# Patient Record
(demographics unavailable — no encounter records)

---

## 2025-04-02 NOTE — REASON FOR VISIT
[Behavioral Health Urgent Care Assessment] : a behavioral health urgent care assessment [School] : school [Patient] : patient [Self] : alone

## 2025-04-09 NOTE — PHYSICAL EXAM
[Normal] : normal [None] : none [Cooperative] : cooperative [Clear] : clear [Linear/Goal Directed] : linear/goal directed [Preoccupations/Ruminations] : preoccupations/ruminations [WNL] : within normal limits [Average] : average [Mostly blames others for problems] : Mostly blames others for problems [Moderate] : moderate [FreeTextEntry8] : 'not great' [de-identified] : becomes irritable and on brink of tears when talking about going back to school [FreeTextEntry7] : convinced that needs to repeat 10th grade [de-identified] : limited

## 2025-04-09 NOTE — HISTORY OF PRESENT ILLNESS
[FreeTextEntry1] : Patient is a 15 yo M, living with parents (older brother in boarding school), currently enrolled at [employer], 10th grade (regular ed), currently not in outpatient treatment, with no past psychiatric history,  no hx of suicide attempts, no self-injury, no trauma/abuse hx, no hx of aggression or legal history, no CPS involvement, no substance use, no PMH, no significant family hx, who was referred to [facility] by school for school avoidance.   On collateral from parents, they relay that patient has had difficulty adjusting to the academic rigor and expectations since moving to [employer] [facility] for 10th grade. Prior to this year he was at a Boarding school in [place] for one year where he also struggled to be independent, thus parents decided to move to /[place] to be close to family/ help patient get good education. Prior to the 9th grade, he was raised and went to schools in [place], where he did well academically except for math until 8th grade they state when he refused to go to school once he got into the boarding school. One big difference per parents is more emphasis on homework assignments rather than tests alone in this educational system, and they theorize that is why he is struggling academically. With each quarter, he insists on completing assignments without their help, and each marking period he is disappointed with his grades. Over the course of the past few weeks, he was ill and missed school, and has refused returning to school since then. Becomes tense, angry, when they bring up school, and at one point walked out of the house, which concerned them. They state his safety is their priority and do not want to risk having that happen again. Deny any knowledge of SI but worry about it.     On interview with him, he relays struggling academically and procrastination, difficulties with focus, and acknowledge drop in grades this year. Adamently, denies any SI/HI/I/P or self-injuries urges. Future oriented. Identifies interests. Mood is good when not thinking or talking about school. Convinced that he would just do better if he can 'take a break and repeat 10th grade next year.'  He now expresses embarrassment about needing extra help and avoids attending school. When he does go to school, he calls his parents midday requesting to be picked up due to feeling uncomfortable. He has become irritable, argumentative, and isolates himself in his room when encouraged to attend school. His mother notes he believes this past experience justifies not completing the current school year. He enjoys badminton, video games, chess, and keeping up with current events.  The patient reports enjoying these extracurricular activities, but expresses feeling hopeless and unmotivated due to his academic struggles. He believes repeating 10th grade is the only solution to regain confidence and alleviate shame, and would utilize the summer to prepare.  ROS: Mood is "not great" due to academic pressure. He feels like a failure and reports excessive worry (3 times daily for 30 minutes to 1 hour) regarding school performance. He denies suicidal ideation, self-harm urges, and homicidal ideation. He sleeps 7 hours nightly but reports fatigue and low energy upon waking, with difficulty staying awake during his first-period class. Appetite is unimpaired. He reports a longstanding history of difficulty concentrating, including while studying in [place]. Symptoms include difficulty focusing, distractibility, doodling, daydreaming (family, current events, video games), restlessness, and fidgeting. These symptoms are exacerbated in math class, which he finds boring. He reports increased restroom use during math. Concentration difficulties also extend to homework completion. He denies panic symptoms, suicidal/self-injurious ideation or behavior, homicidal ideation, and past or present psychosis (including auditory hallucinations and paranoia). Symptoms of jennie and OCD are denied. No access to firearms reported. Denies alcohol, vaping, or marijuana use.  [FreeTextEntry2] : none [FreeTextEntry3] : none

## 2025-04-09 NOTE — HISTORY OF PRESENT ILLNESS
[FreeTextEntry1] : Patient is a 15 yo M, living with parents (older brother in boarding school), currently enrolled at [employer], 10th grade (regular ed), currently not in outpatient treatment, with no past psychiatric history,  no hx of suicide attempts, no self-injury, no trauma/abuse hx, no hx of aggression or legal history, no CPS involvement, no substance use, no PMH, no significant family hx, who was referred to [facility] by school for school avoidance.   On collateral from parents, they relay that patient has had difficulty adjusting to the academic rigor and expectations since moving to [facility] for 10th grade. Prior to this year he was at a Boarding school in [place] for one year where he also struggled to be independent, thus parents decided to move to /[place] to be close to family/ help patient get good education. Prior to the 9th grade, he was raised and went to schools in [place], where he did well academically except for math until 8th grade they state when he refused to go to school once he got into the boarding school. One big difference per parents is more emphasis on homework assignments rather than tests alone in this educational system, and they theorize that is why he is struggling academically. With each quarter, he insists on completing assignments without their help, and each marking period he is disappointed with his grades. Over the course of the past few weeks, he was ill and missed school, and has refused returning to school since then. Becomes tense, angry, when they bring up school, and at one point walked out of the house, which concerned them. They state his safety is their priority and do not want to risk having that happen again. Deny any knowledge of SI but worry about it.     On interview with him, he relays struggling academically and procrastination, difficulties with focus, and acknowledge drop in grades this year. Adamently, denies any SI/HI/I/P or self-injuries urges. Future oriented. Identifies interests. Mood is good when not thinking or talking about school. Convinced that he would just do better if he can 'take a break and repeat 10th grade next year.'  He now expresses embarrassment about needing extra help and avoids attending school. When he does go to school, he calls his parents midday requesting to be picked up due to feeling uncomfortable. He has become irritable, argumentative, and isolates himself in his room when encouraged to attend school. His mother notes he believes this past experience justifies not completing the current school year. He enjoys badminton, video games, chess, and keeping up with current events.  The patient reports enjoying these extracurricular activities, but expresses feeling hopeless and unmotivated due to his academic struggles. He believes repeating 10th grade is the only solution to regain confidence and alleviate shame, and would utilize the summer to prepare.  ROS: Mood is "not great" due to academic pressure. He feels like a failure and reports excessive worry (3 times daily for 30 minutes to 1 hour) regarding school performance. He denies suicidal ideation, self-harm urges, and homicidal ideation. He sleeps 7 hours nightly but reports fatigue and low energy upon waking, with difficulty staying awake during his first-period class. Appetite is unimpaired. He reports a longstanding history of difficulty concentrating, including while studying in [place]. Symptoms include difficulty focusing, distractibility, doodling, daydreaming (family, current events, video games), restlessness, and fidgeting. These symptoms are exacerbated in math class, which he finds boring. He reports increased restroom use during math. Concentration difficulties also extend to homework completion. He denies panic symptoms, suicidal/self-injurious ideation or behavior, homicidal ideation, and past or present psychosis (including auditory hallucinations and paranoia). Symptoms of jennie and OCD are denied. No access to firearms reported. Denies alcohol, vaping, or marijuana use.  [FreeTextEntry2] : none [FreeTextEntry3] : none

## 2025-04-09 NOTE — PHYSICAL EXAM
[Normal] : normal [None] : none [Cooperative] : cooperative [Clear] : clear [Linear/Goal Directed] : linear/goal directed [Preoccupations/Ruminations] : preoccupations/ruminations [WNL] : within normal limits [Average] : average [Mostly blames others for problems] : Mostly blames others for problems [Moderate] : moderate [FreeTextEntry8] : 'not great' [de-identified] : becomes irritable and on brink of tears when talking about going back to school [FreeTextEntry7] : convinced that needs to repeat 10th grade [de-identified] : limited

## 2025-04-09 NOTE — HISTORY OF PRESENT ILLNESS
[FreeTextEntry1] : Patient is a 15 yo M, living with parents (older brother in boarding school), currently enrolled at [employer], 10th grade (regular ed), currently not in outpatient treatment, with no past psychiatric history,  no hx of suicide attempts, no self-injury, no trauma/abuse hx, no hx of aggression or legal history, no CPS involvement, no substance use, no PMH, no significant family hx, who was referred to [facility] by school for school avoidance.   On collateral from parents, they relay that patient has had difficulty adjusting to the academic rigor and expectations since moving to [employer] [facility] for 10th grade. Prior to this year he was at a Boarding school in [place] for one year where he also struggled to be independent, thus parents decided to move to /[place] to be close to family/ help patient get good education. Prior to the 9th grade, he was raised and went to schools in [place], where he did well academically except for math until 8th grade they state when he refused to go to school once he got into the boarding school. One big difference per parents is more emphasis on homework assignments rather than tests alone in this educational system, and they theorize that is why he is struggling academically. With each quarter, he insists on completing assignments without their help, and each marking period he is disappointed with his grades. Over the course of the past few weeks, he was ill and missed school, and has refused returning to school since then. Becomes tense, angry, when they bring up school, and at one point walked out of the house, which concerned them. They state his safety is their priority and do not want to risk having that happen again. Deny any knowledge of SI but worry about it.     On interview with him, he relays struggling academically and procrastination, difficulties with focus, and acknowledge drop in grades this year. Adamently, denies any SI/HI/I/P or self-injuries urges. Future oriented. Identifies interests. Mood is good when not thinking or talking about school. Convinced that he would just do better if he can 'take a break and repeat 10th grade next year.'  He now expresses embarrassment about needing extra help and avoids attending school. When he does go to school, he calls his parents midday requesting to be picked up due to feeling uncomfortable. He has become irritable, argumentative, and isolates himself in his room when encouraged to attend school. His mother notes he believes this past experience justifies not completing the current school year. He enjoys badminton, video games, chess, and keeping up with current events.  The patient reports enjoying these extracurricular activities, but expresses feeling hopeless and unmotivated due to his academic struggles. He believes repeating 10th grade is the only solution to regain confidence and alleviate shame, and would utilize the summer to prepare.  ROS: Mood is "not great" due to academic pressure. He feels like a failure and reports excessive worry (3 times daily for 30 minutes to 1 hour) regarding school performance. He denies suicidal ideation, self-harm urges, and homicidal ideation. He sleeps 7 hours nightly but reports fatigue and low energy upon waking, with difficulty staying awake during his first-period class. Appetite is unimpaired. He reports a longstanding history of difficulty concentrating, including while studying in [place]. Symptoms include difficulty focusing, distractibility, doodling, daydreaming (family, current events, video games), restlessness, and fidgeting. These symptoms are exacerbated in math class, which he finds boring. He reports increased restroom use during math. Concentration difficulties also extend to homework completion. He denies panic symptoms, suicidal/self-injurious ideation or behavior, homicidal ideation, and past or present psychosis (including auditory hallucinations and paranoia). Symptoms of jennie and OCD are denied. No access to firearms reported. Denies alcohol, vaping, or marijuana use.  [FreeTextEntry2] : none [FreeTextEntry3] : none

## 2025-04-09 NOTE — PLAN
[Family] : family [Education provided regarding environmental safety/ lethal means restriction] : Education provided regarding environmental safety/ lethal means restriction [Contact was Attempted] : contact was attempted [TextBox_9] : school avoidance consultation - specifically work with parent; peds neuro for further ADHD assmnt [TextBox_11] : none [TextBox_13] : Encouraged to call back if symptoms worsen, and of course seek higher level of care if safety concerns arise (i.e. calling 911, going to the nearest ED.)  [TextBox_26] : spoke with school related to ideas of starting some accomodation related to school to encourage return to school (i.e. weighing tests more than other assignments given his particular strength on exams.)

## 2025-04-09 NOTE — PHYSICAL EXAM
[Normal] : normal [None] : none [Cooperative] : cooperative [Clear] : clear [Linear/Goal Directed] : linear/goal directed [Preoccupations/Ruminations] : preoccupations/ruminations [WNL] : within normal limits [Average] : average [Mostly blames others for problems] : Mostly blames others for problems [Moderate] : moderate [FreeTextEntry8] : 'not great' [de-identified] : becomes irritable and on brink of tears when talking about going back to school [FreeTextEntry7] : convinced that needs to repeat 10th grade [de-identified] : limited

## 2025-04-10 NOTE — PHYSICAL EXAM
[FreeTextEntry1] : Patient is a 15 yo M, living with parents (older brother in boarding school), currently enrolled at [employer], 10th grade (regular ed), currently not in outpatient treatment, with no past psychiatric history, no hx of suicide attempts, no self-injury, no trauma/abuse hx, no hx of aggression or legal history, no CPS involvement, no substance use, no PMH, no significant family hx, who was referred to [facility] by school for school avoidance. pt and mom presenting to [facility] for school avoidance counseling.  [Cooperative] : cooperative [Evasive] : evasive [Constricted] : constricted [Clear] : clear [Linear/Goal Directed] : linear/goal directed [Average] : average [WNL] : within normal limits

## 2025-04-10 NOTE — RISK ASSESSMENT
[No, patient denies ideation or behavior] : No, patient denies ideation or behavior [FreeTextEntry8] : pt denies si/sib [FreeTextEntry7] : pt denies aggression/hi [FreeTextEntry9] : pt not at risk of harming self/others [Low acute suicide risk] : Low acute suicide risk [No] : No [Not clinically indicated] : Safety Plan completed/updated (for individuals at risk): Not clinically indicated

## 2025-04-10 NOTE — PLAN
[FreeTextEntry2] : Sras-r: f1: 3.5, f2: 2.1, f3: 4, f4: 3.2.  leading function: pt meeting criteria for function 3 - gaining attention. goal for parent training and contingency management: put parents back in charge by increasing skills to address problem behaviors, focus on positive behaviors and increase rewards for attending school.  secondary function: pt meeting criteria for function 1 of school avoidance - avoid negative emotions, goal of 1:1 sessions are as follows: psychoed, somatic control exercises, gradual reexposure to school, self-reinforcement/regulation to decrease physical sx, increase ability to cope w/ discomfort and ease into reentry.   [Acceptance and Commitment Therapy] : Acceptance and Commitment Therapy  [Behavioral Parent Training] : Behavioral Parent Training  [Cognitive and/or Behavior Therapy] : Cognitive and/or Behavior Therapy  [Contingency Management] : Contingency Management  [Dialectical Behavior Therapy] : Dialectical Behavior Therapy  [Exposure +/- Response Prevention] : Exposure +/- Response Prevention  [Family Therapy (all types)] : Family Therapy (all types)  [Motivational Interviewing] : Motivational Interviewing  [Psychoeducation] : Psychoeducation  [Skills training (all types)] : Skills training (all types)  [Supportive Therapy] : Supportive Therapy [de-identified] : Pt presented to [facility] for school avoidance counseling. pt reports his avoidance began last month after being sick w/ a fever. He reports during this time he fell behind on work, grades dropped and he struggled to catch up. Pt reports having academic challenges that discourage him from wanting to go to school as he feels his grades are insufficient. He reports he struggles to participate in classes as some classes are boring. he states he has few friends in school but worries that people will say things to him about his absences. Pt denies difficulties in the AM and decides to stay home the night before or morning of, but cannot ID what drives this decision. He reports he likes being home instead of being in school. he reports he entertains himself if he is home by doing activities he enjoys instead of being in school such as playing video games, doing homework or doing other extracurricular activities. Pt reports he attends about 3 days/week but only attends [employer]. Pt appears to be working within a gradual exposure plan as he had previously had multiple full absences. Pt denies wanting to work with writer towards this goal as he believes he can do the work on his own. Writer provided pt w/ feedback and psychoed about the goals within the school avoidance program. Discussed a gradual exposure plan and contingency management plan but pt not willing to make changes to plan w/ writer, understood consequences for absences and does not want to earn rewards for going.  writer encouraged pt to bring mom to session to meet w/ writer alone. Mom reports pt is going to visit a private school as pt reports mom transferred to [employer] last year and has been struggling. Mom reports pt feels there is much academic pressure at his current school and is hoping a private school will have a more supportive/relaxed paced environment. mom reports after being sick, pt has lost motivation to tend to classwork/hw/complete make up assignments. mom reports pt is lacking confidence in school and puts pressure on himself to succeed. Mom also reports pt has a lack of social support at his school. writer provided mom w/ psychoed about the 4 functions of school avoidance and shared scores on the Sras-r: f1: 3.5, f2: 2.1, f3: 4, f4: 3.2. Discussed appropriate tx goals based on scores and discussed the importance of creating a contingency plan to remove rewards for staying home and giving rewards for going to school. mom receptive to all though report they struggle w/ consistency as they don't want to make pt feel worse, and he does not always respond well to limit setting. writer validated this and provided mom w/ psychoed, feedback and motivational interviewing. mom receptive. She reports feeling confident that pt will attend school as scheduled after spring break. she reports she plans to encourage him to catch up on schoolwork during break.  [Recommended Frequency of Visits: ____] : Recommended frequency of visits: [unfilled] [FreeTextEntry1] : Mom will update writer about status of attendance after spring break if writer's assistance is still needed.

## 2025-04-10 NOTE — REASON FOR VISIT
[Patient preference] : as per patient preference [Continuing, patient seen in-person within last 12 months] : Telehealth services are continuing as patient has been seen in-person within last 12 months. [Telehealth (audio & video) - Individual/Group] : This visit was provided via telehealth using real-time 2-way audio visual technology. [Other Location: e.g. Home (Enter Location, City,State)___] : The provider was located at [unfilled]. [Home] : The patient, [unfilled], was located at home, [unfilled], at the time of the visit. [Mother] : mother [Verbal consent obtained from patient/other participant(s)] : Verbal consent for telehealth/telephonic services obtained from patient/other participant(s) [FreeTextEntry4] : 10:00 [FreeTextEntry5] : 10:50 [FreeTextEntry2] : 4/2/25 [FreeTextEntry1] : [place] ,[place]

## 2025-04-10 NOTE — END OF VISIT
[Duration of Psychotherapy Visit (minutes spent in synchronous communication): ____] : Duration of Psychotherapy Visit (minutes spent in synchronous communication): [unfilled] [Individual Psychotherapy for 38-52 minutes] : Individual Psychotherapy for 38 - 52 minutes [Teletherapy Service Provided] : The services provided in this session were delivered via tele-therapy [FreeTextEntry3] : [place] [FreeTextEntry5] : [place] [Licensed Clinician] : Licensed Clinician

## 2025-05-21 NOTE — HISTORY OF PRESENT ILLNESS
[FreeTextEntry1] : (Chingyuenico) Devante is a 15 y/o male here for an initial visit for anxiety and depression. Patient domiciled with mom. His brother who is 17 y/o is in boarding school in [place]. Dad travels often for work. He spends a lot of time in [place]. Devante is 10th grade at [employer] in [facility] in [place]. No school based accomodations. [place] he lived [place] in 20[place].    Early development: Devante was born full term via NVD. He was discharged home with mother. He met all early developmental milestones appropriately.   EDUCATION: -School: 10th grade in [employer]  -Private school -No 504 Plan or IEP -General education classroom -Accommodations- none -Academics-A+ in all core subjects  Onset of symptoms/Course and duration: School avoidance started in March 2025 due to academic stressors and inability to keep up with schoolwork demands.  Situational triggers or stressors: academic stressors. Devante is reporting at his old school in [employer] he was encountering hardships academically which led to school avoidance. His grades were in the B and C range. He recently transferred out of [employer] to [employer]. The transfer took place in the end of April. Since the transfer Devante reports he has been doing much better academically and has an A+ in his core subjects. Devante and mom report he has not missed any school since he started his new school.  He reports a longstanding history of difficulty concentrating, including while studying in [place]. Symptoms include difficulty focusing, distractibility, doodling, daydreaming (family, current events, video games), restlessness, and fidgeting. These symptoms are exacerbated in math class, which he finds boring. He reports increased restroom use during math. Concentration difficulties also extend to homework completion.  Impact on academic, social, and family functioning talks to his friends on the phone, also has friends in [place] who he talks too  Medical History: Denies Past Psychiatric History: Denies Hospitalizations: Denies Previous interventions (therapy, school services, medications): Denies  Suicidal ideation/attempts: Denies Aggression or self-harming behaviors: Denies Sleep: Goes to bed at 11:30PM and wakes up at 7:30AM Appetite: "Normal" Current medications: Denies  Allergies: Denies Family mental health history (depression, anxiety, ADHD, etc.): Denies  Cardiac hx: Denies  Substance use in the family: Denies Behavioral issues in school/bullying/Attendance issues: Denies any current bullying, denies any attendance issues since starting new school in April 2025 Social History/Peer relationships: Good relationship with others Substance use/vape: Denies Trauma/abuse history: Denies Legal involvement (if any): Denies Access to gun/lethal weapons: Denies   JAMEY 7-Score 2 minimal anxiety  PHQ9-Score 3 minimal depression  Mental Status Examination (MSE) Appearance:  15-year-old [place] male, appears stated age. Well-groomed, dressed in casual attire. Makes appropriate eye contact and sits with an open posture. Behavior: Client demonstrates a cooperative attitude, readily engaging in the assessment process. Speech and language: Speech is of normal rate, volume, and tone. Content is relevant and coherent Mood: Client reports mood as 'pretty normal, happy.' Affect is full range, appropriate to content, and congruent with reported mood. Thought process: Thought process is logical and goal-directed, with client able to provide clear and relevant responses. Thought content: No evidence of delusions, obsessions, or phobias. Denies current suicidal or homicidal ideation. Perceptual disturbances: No reported or observed perceptual disturbances. Cognitive functioning intact Insight and judgment: fair Developmental appropriateness: Client displays normal psychomotor activity, with no unusual movements noted.   ROS: (Review of systems)   Regarding depression, patients deny previous depressive episodes. Patient denies feeling depressed or hopeless. Denies SI, NSSIB urges. Regarding bipolar, denies sxs consistent with hx of jennie/hypomania, such as 3+ days with decreased need for sleep and increased energy. Regarding anxiety, see HPI. Regarding trauma, denies. Denies flashbacks, nightmares. Denies emotional, sexual, or physical abuse. Regarding psychosis, denies AVH. Denies paranoia.  Regarding substances, denies ETOH, marijuana, vape use.

## 2025-05-21 NOTE — REASON FOR VISIT
[Initial Consultation] : an initial consultation for [Patient] : patient [Mother] : mother [ADHD] : ADHD [Other: ____] : [unfilled]

## 2025-05-21 NOTE — ASSESSMENT
[FreeTextEntry1] : Devante is a 15 y/o male presenting for an initial visit for ADHD management recommendations. He was seen in [facility] 02[facility]2/25 for school avoidance related to academic stressors. He recently transferred out of [employer] to [employer]. Since the transfer Devante reports he has been doing much better academically and has an A+ in his core subjects. Devante and mom report he has not missed any school since he started his new school. Patient was screened negative for problems such as anxiety, depression, and sleep problems.   Will proceed with ADHD evaluation. [place] forms given to mother.

## 2025-05-21 NOTE — PHYSICAL EXAM
[Well-appearing] : well-appearing [Normocephalic] : normocephalic [Alert] : alert [Conversant] : conversant [Normal gait] : normal gait

## 2025-05-21 NOTE — PLAN
[FreeTextEntry1] : -Psychoeducation provided regarding possible ADHD diagnosis. -Psychoeducation provided regarding anxiety. -Recommended psychotherapy for ADHD -SAKSHI Roney to provide mom with therapists accepted by her insurance -[place] parent and teacher forms to be completed prior to next visit. -Follow up in 1 month via TEB.

## 2025-05-21 NOTE — REVIEW OF SYSTEMS
[Patient Intake Form Reviewed] : patient intake form reviewed [Normal] : Hematologic/Lymphatic [FreeTextEntry8] : see hpi  [de-identified] : see hpi

## 2025-05-21 NOTE — CONSULT LETTER
[Dear  ___] : Dear  [unfilled], [Consult Letter:] : I had the pleasure of evaluating your patient, [unfilled]. [Consult Closing:] : Thank you very much for allowing me to participate in the care of this patient.  If you have any questions, please do not hesitate to contact me. [Sincerely,] : Sincerely, [FreeTextEntry3] : Russ Prasad    Certified Psychiatric Nurse Practitioner Pediatric Neurology [facility]

## 2025-05-21 NOTE — END OF VISIT
[Time Spent: ___ minutes] : I have spent [unfilled] minutes of time on the encounter which excludes teaching and separately reported services. [FreeTextEntry3] :  I, Dr. Casanova, personally performed the evaluation and management (E/M) services for this new patient. That E/M includes conducting the clinically appropriate initial history &/or exam, assessing all conditions, and establishing the plan of care. Today, my NUSRAT, [employer] was here to observe my evaluation and management service for this patient & follow plan of care established by me going forward.

## 2025-06-19 NOTE — END OF VISIT
[FreeTextEntry3] :  I, Dr. Ackerman, personally performed the evaluation and management (E/M) services for this new patient.? That E/M includes conducting the clinically appropriate initial history &/or exam, assessing all conditions, and establishing the plan of care.? Today, my NUSRAT, [employer] was here to observe my evaluation and management service for this patient & follow plan of care established by me going forward.  [Time Spent: ___ minutes] : I have spent [unfilled] minutes of time on the encounter which excludes teaching and separately reported services.

## 2025-06-19 NOTE — DATA REVIEWED
[FreeTextEntry1] : [place] questionnaires were completed by parents and teacher.  Parents responses: Inattention 8/9 Hyperactivity 0/9 ODD: 0/8 Conduct disorder: 0/14 Anxiety/ Depression: 1/7  Teachers responses: Inattention 9/9 Hyperactivity 1/9 ODD/ Conduct: 0/10 Anxiety/ Depression: 0/7  - ADHD Performance questions: Parents: Overall school performance, participation in organized activities  Teachers: relationship with peers following directions, assignment completion, organizational skills

## 2025-06-19 NOTE — DATA REVIEWED
[FreeTextEntry1] : [place] questionnaires were completed by parents and teacher.  Parents responses: Inattention 8/9 Hyperactivity 0/9 ODD: 0/8 Conduct disorder: 0/14 Anxiety/ Depression: 1/7  Teachers responses: Inattention 9/9 Hyperactivity 1/9 ODD/ Conduct: 0/10 Anxiety/ Depression: 0/7  - ADHD Performance questions: Parents: Overall school performance, participation in organized activities  Teachers: relationship with peers following directions, assignment completion, organizational skills

## 2025-06-19 NOTE — PLAN
[FreeTextEntry1] : - Start Concerta 18 mg once daily in the morning.   May increase to 2 X 18 mg tablets after 2 weeks if well  tolerated and not effective. -Psychoeducation provided re: ADHD.  Resources for education given -Discussed management for ADHD including behavioral interventions and medication options. -Counseling on ADHD medication; stimulants are 1st line treatment. Discussed stimulant medications; risks, benefits, possible side effects. -Letter to request 504 Plan for school provided -Follow up in 1 month via TEB -Mom to call earlier with any concerns

## 2025-06-19 NOTE — HISTORY OF PRESENT ILLNESS
[FreeTextEntry1] : (Chingyuen) Devante is a 15 y/o male here for an initial visit for anxiety and depression. Patient domiciled with mom. His brother who is 17 y/o is in boarding school in [place]. Dad travels often for work. He spends a lot of time in [place]. Devante is 10th grade at [employer] in [facility] in [place]. No school based accomodations. [place].  PLAN FROM PREVIOUS VISIT -Psychoeducation provided regarding possible ADHD diagnosis. -Psychoeducation provided regarding anxiety. -Recommended psychotherapy for ADHD -SAKSHI Roney to provide mom with therapists accepted by her insurance -[place] parent and teacher forms to be completed prior to next visit. -Follow up in 1 month via TEB.  INTERIM HPI-06/17/2025 Review of [place] parent and teacher are consistent with a diagnosis of ADHD predominantly inattentive type.    [place] questionnaires were completed by parents and teacher.  Parents responses: Inattention 8/9 Hyperactivity 0/9 ODD: 0/8 Conduct disorder: 0/14 Anxiety/ Depression: 1/7  Teachers responses: Inattention 9/9 Hyperactivity 1/9 ODD/ Conduct: 0/10 Anxiety/ Depression: 0/7  - ADHD Performance questions: Parents: Overall school performance, participation in organized activities  Teachers: relationship with peers following directions, assignment completion, organizational skills

## 2025-06-19 NOTE — ASSESSMENT
[FreeTextEntry1] : Devante is a 15 y/o male presenting for follow up for ADHD management recommendations. He was seen in [facility] 02/2/25 for school avoidance related to academic stressors. He recently transferred out of [employer] [facility]. Since the transfer Devante reports he has been doing much better academically and has an A+ in his core subjects. Devante and mom report he has not missed any school since he started his new school. Mom and Devante have no acute safety concerns at this time.   Patient was screened negative for problems such as anxiety, depression, and sleep problems.  [place] parent and teacher forms reviewed and are consistent with ADHD, predominantly inattentive type.

## 2025-06-19 NOTE — HISTORY OF PRESENT ILLNESS
[FreeTextEntry1] : (Chingyuen) Devante is a 15 y/o male here for an initial visit for anxiety and depression. Patient domiciled with mom. His brother who is 17 y/o is in boarding school in [place]. Dad travels often for work. He spends a lot of time in [place]. Devante is 10th grade at [employer] in [facility] in [place]. No school based accomodations. [place] lived [place] in 20[place].  PLAN FROM PREVIOUS VISIT -Psychoeducation provided regarding possible ADHD diagnosis. -Psychoeducation provided regarding anxiety. -Recommended psychotherapy for ADHD -SAKSHI Roney to provide mom with therapists accepted by her insurance -[place] parent and teacher forms to be completed prior to next visit. -Follow up in 1 month via TEB.  INTERIM HPI-06/17/2025 Review of [place] parent and teacher are consistent with a diagnosis of ADHD predominantly inattentive type.    [place] questionnaires were completed by parents and teacher.  Parents responses: Inattention 8/9 Hyperactivity 0/9 ODD: 0/8 Conduct disorder: 0/14 Anxiety/ Depression: 1/7  Teachers responses: Inattention 9/9 Hyperactivity 1/9 ODD/ Conduct: 0/10 Anxiety/ Depression: 0/7  - ADHD Performance questions: Parents: Overall school performance, participation in organized activities  Teachers: relationship with peers following directions, assignment completion, organizational skills

## 2025-06-19 NOTE — END OF VISIT
[FreeTextEntry3] :  I, Dr. Ackerman, personally performed the evaluation and management (E/M) services for this new patient.? That E/M includes conducting the clinically appropriate initial history &/or exam, assessing all conditions, and establishing the plan of care.? Today, my NUSRAT, [employer] was here to observe my evaluation and management service for this patient & follow plan of care established by me going forward.  [Time Spent: ___ minutes] : I have spent [unfilled] minutes of time on the encounter which excludes teaching and separately reported services.

## 2025-06-19 NOTE — ASSESSMENT
[FreeTextEntry1] : Devante is a 15 y/o male presenting for follow up for ADHD management recommendations. He was seen in [facility] 02/2/25 for school avoidance related to academic stressors. He recently transferred out of [employer] to [employer]. Since the transfer Devante reports he has been doing much better academically and has an A+ in his core subjects. Devante and mom report he has not missed any school since he started his new school. Mom and Devante have no acute safety concerns at this time.   Patient was screened negative for problems such as anxiety, depression, and sleep problems.  [place] parent and teacher forms reviewed and are consistent with ADHD, predominantly inattentive type.

## 2025-06-19 NOTE — ASSESSMENT
[FreeTextEntry1] : Devante is a 15 y/o male presenting for follow up for ADHD management recommendations. He was seen in [facility] 02/2/25 for school avoidance related to academic stressors. He recently transferred out of [employer] to [employer]. Since the transfer Devante reports he has been doing much better academically and has an A+ in his core subjects. Devante and mom report he has not missed any school since he started his new school. Mom and Devante have no acute safety concerns at this time.   Patient was screened negative for problems such as anxiety, depression, and sleep problems.  [place] parent and teacher forms reviewed and are consistent with ADHD, predominantly inattentive type.

## 2025-06-19 NOTE — DATA REVIEWED
[FreeTextEntry1] : [place] questionnaires were completed by parents and teacher.  Parents responses: Inattention 8/9 Hyperactivity 0/9 ODD: 0/8 Conduct disorder: 0/14 Anxiety/ Depression: 1/7  Teachers responses: Inattention 9/9 Hyperactivity 1/9 ODD/ Conduct: 0/10 Anxiety/ Depression: 0/7  - ADHD Performance questions: Parents: Overall school performance, participation in organized activities  Teachers: relationship with peers following directions, assignment completion, organizational skills

## 2025-06-19 NOTE — HISTORY OF PRESENT ILLNESS
[FreeTextEntry1] : (Chingyuen) Devante is a 15 y/o male here for an initial visit for anxiety and depression. Patient domiciled with mom. His brother who is 17 y/o is in boarding school in [place]. Dad travels often for work. He spends a lot of time in [place]. Devante is 10th grade at [email or web address] in [facility] in [place]. No school based accomodations. [place].  PLAN FROM PREVIOUS VISIT -Psychoeducation provided regarding possible ADHD diagnosis. -Psychoeducation provided regarding anxiety. -Recommended psychotherapy for ADHD -SAKSHI Roney to provide mom with therapists accepted by her insurance -[place] parent and teacher forms to be completed prior to next visit. -Follow up in 1 month via TEB.  INTERIM HPI-06/17/2025 Review of [place] parent and teacher are consistent with a diagnosis of ADHD predominantly inattentive type.    [place] questionnaires were completed by parents and teacher.  Parents responses: Inattention 8/9 Hyperactivity 0/9 ODD: 0/8 Conduct disorder: 0/14 Anxiety/ Depression: 1/7  Teachers responses: Inattention 9/9 Hyperactivity 1/9 ODD/ Conduct: 0/10 Anxiety/ Depression: 0/7  - ADHD Performance questions: Parents: Overall school performance, participation in organized activities  Teachers: relationship with peers following directions, assignment completion, organizational skills

## 2025-06-19 NOTE — END OF VISIT
[FreeTextEntry3] :  I, Dr. Ackerman, personally performed the evaluation and management (E/M) services for this new patient.? That E/M includes conducting the clinically appropriate initial history &/or exam, assessing all conditions, and establishing the plan of care.? Today, my NUSRAT, [employer] was here to observe my evaluation and management service for this patient & follow plan of care established by me going forward.  [Time Spent: ___ minutes] : I have spent [unfilled] minutes of time on the encounter which excludes teaching and separately reported services.